# Patient Record
Sex: FEMALE | Race: WHITE | Employment: UNEMPLOYED | URBAN - NONMETROPOLITAN AREA
[De-identification: names, ages, dates, MRNs, and addresses within clinical notes are randomized per-mention and may not be internally consistent; named-entity substitution may affect disease eponyms.]

---

## 2022-07-05 ENCOUNTER — APPOINTMENT (OUTPATIENT)
Dept: GENERAL RADIOLOGY | Age: 62
End: 2022-07-05
Attending: EMERGENCY MEDICINE
Payer: MEDICAID

## 2022-07-05 ENCOUNTER — HOSPITAL ENCOUNTER (EMERGENCY)
Age: 62
Discharge: HOME | End: 2022-07-05
Attending: EMERGENCY MEDICINE
Payer: MEDICAID

## 2022-07-05 VITALS
BODY MASS INDEX: 26.76 KG/M2 | SYSTOLIC BLOOD PRESSURE: 123 MMHG | DIASTOLIC BLOOD PRESSURE: 73 MMHG | WEIGHT: 166.5 LBS | RESPIRATION RATE: 18 BRPM | OXYGEN SATURATION: 96 % | HEIGHT: 66 IN | HEART RATE: 98 BPM | TEMPERATURE: 98.3 F

## 2022-07-05 DIAGNOSIS — M67.922 TENDINOPATHY OF ELBOW, LEFT: Primary | ICD-10-CM

## 2022-07-05 PROCEDURE — 73090 X-RAY EXAM OF FOREARM: CPT

## 2022-07-05 PROCEDURE — 74011250637 HC RX REV CODE- 250/637: Performed by: EMERGENCY MEDICINE

## 2022-07-05 PROCEDURE — 99283 EMERGENCY DEPT VISIT LOW MDM: CPT

## 2022-07-05 RX ORDER — INDOMETHACIN 25 MG/1
25 CAPSULE ORAL 3 TIMES DAILY
Qty: 30 CAPSULE | Refills: 0 | Status: SHIPPED | OUTPATIENT
Start: 2022-07-05 | End: 2022-07-15

## 2022-07-05 RX ORDER — ACETAMINOPHEN 325 MG/1
650 TABLET ORAL
Qty: 20 TABLET | Refills: 0 | Status: SHIPPED | OUTPATIENT
Start: 2022-07-05

## 2022-07-05 RX ORDER — LIDOCAINE 50 MG/G
PATCH TOPICAL
Qty: 1 EACH | Refills: 0 | Status: SHIPPED | OUTPATIENT
Start: 2022-07-05

## 2022-07-05 RX ORDER — HYDROCODONE BITARTRATE AND ACETAMINOPHEN 5; 325 MG/1; MG/1
1 TABLET ORAL
Status: COMPLETED | OUTPATIENT
Start: 2022-07-05 | End: 2022-07-05

## 2022-07-05 RX ORDER — OXYCODONE HYDROCHLORIDE 5 MG/1
5 TABLET ORAL
Qty: 8 TABLET | Refills: 0 | Status: SHIPPED | OUTPATIENT
Start: 2022-07-05 | End: 2022-07-08

## 2022-07-05 RX ORDER — OXYCODONE HYDROCHLORIDE 5 MG/1
5 TABLET ORAL
Qty: 8 TABLET | Refills: 0 | Status: SHIPPED | OUTPATIENT
Start: 2022-07-05 | End: 2022-07-05 | Stop reason: SDUPTHER

## 2022-07-05 RX ADMIN — HYDROCODONE BITARTRATE AND ACETAMINOPHEN 1 TABLET: 5; 325 TABLET ORAL at 18:01

## 2022-07-05 NOTE — ED PROVIDER NOTES
Patient is a 68-year-old female without significant past medical history who presents to the emergency department complaining of 1 month of persistent left arm pain from her elbow down to her wrist.  She states that 1 month ago she was moving furniture and she felt and heard a pop and then she has had persistent pain and swelling radiating from her elbow to her mid forearm since that time. Pain with flexion extension of the elbow pain with pronation supination of the forearm. No numbness no tingling. No fevers no chills no chest pain or shortness of breath. History reviewed. No pertinent past medical history. History reviewed. No pertinent surgical history. History reviewed. No pertinent family history. Social History     Socioeconomic History    Marital status: LEGALLY      Spouse name: Not on file    Number of children: Not on file    Years of education: Not on file    Highest education level: Not on file   Occupational History    Not on file   Tobacco Use    Smoking status: Not on file    Smokeless tobacco: Not on file   Substance and Sexual Activity    Alcohol use: Not on file    Drug use: Not on file    Sexual activity: Not on file   Other Topics Concern    Not on file   Social History Narrative    Not on file     Social Determinants of Health     Financial Resource Strain:     Difficulty of Paying Living Expenses: Not on file   Food Insecurity:     Worried About Running Out of Food in the Last Year: Not on file    Esdras of Food in the Last Year: Not on file   Transportation Needs:     Lack of Transportation (Medical): Not on file    Lack of Transportation (Non-Medical):  Not on file   Physical Activity:     Days of Exercise per Week: Not on file    Minutes of Exercise per Session: Not on file   Stress:     Feeling of Stress : Not on file   Social Connections:     Frequency of Communication with Friends and Family: Not on file    Frequency of Social Gatherings with Friends and Family: Not on file    Attends Mandaen Services: Not on file    Active Member of Clubs or Organizations: Not on file    Attends Club or Organization Meetings: Not on file    Marital Status: Not on file   Intimate Partner Violence:     Fear of Current or Ex-Partner: Not on file    Emotionally Abused: Not on file    Physically Abused: Not on file    Sexually Abused: Not on file   Housing Stability:     Unable to Pay for Housing in the Last Year: Not on file    Number of [place] in the Last Year: Not on file    Unstable Housing in the Last Year: Not on file         ALLERGIES: Pcn [penicillins] and Aspirin    Review of Systems   Constitutional: Negative for appetite change and fever. HENT: Negative. Eyes: Negative. Respiratory: Negative. Negative for shortness of breath. Gastrointestinal: Negative for abdominal pain and nausea. Genitourinary: Negative. Musculoskeletal: Positive for arthralgias, joint swelling and myalgias. Skin: Negative for color change, rash and wound. Neurological: Negative. Negative for weakness and numbness. Psychiatric/Behavioral: The patient is not nervous/anxious. Vitals:    07/05/22 1744 07/05/22 1755   BP:  123/73   Pulse: 98    Resp: 18    Temp: 98.3 °F (36.8 °C)    SpO2: 96%    Weight: 75.5 kg (166 lb 8 oz)    Height: 5' 6\" (1.676 m)             Physical Exam  Vitals and nursing note reviewed. Constitutional:       Appearance: Normal appearance. HENT:      Head: Normocephalic and atraumatic. Right Ear: External ear normal.      Left Ear: External ear normal.      Nose: Nose normal.      Mouth/Throat:      Mouth: Mucous membranes are moist.   Eyes:      Pupils: Pupils are equal, round, and reactive to light. Cardiovascular:      Rate and Rhythm: Normal rate and regular rhythm. Pulses: Normal pulses. Pulmonary:      Effort: Pulmonary effort is normal.      Breath sounds: Normal breath sounds. Abdominal:      General: Abdomen is flat. Palpations: Abdomen is soft. Musculoskeletal:      Right upper arm: Normal.      Left upper arm: No tenderness. Right elbow: Normal.      Left elbow: Decreased range of motion. Tenderness present. Right forearm: Normal.      Left forearm: Tenderness and bony tenderness present. Right wrist: Normal.      Left wrist: No swelling, deformity or effusion. Normal range of motion. Normal pulse. Cervical back: Normal range of motion and neck supple. Comments: Sensation intact to light touch. Tenderness over the lateral medial malleoli on the left. Tenderness to the proximal to mid forearm. Pain with flexion extension and pronation supination. Normal range of motion normal strength at the shoulder and wrist joints. Sensation intact throughout   Skin:     Capillary Refill: Capillary refill takes less than 2 seconds. Neurological:      General: No focal deficit present. Mental Status: She is alert. MDM  Number of Diagnoses or Management Options  Diagnosis management comments: Patient is a 27-year-old female presented emergency department for evaluation of left arm pain from her elbow down to her wrist that has been ongoing for the past month. She is tender over the lateral and medial malleoli as well as proximal to distal forearm. Normal flexion extension at the wrist.  Pain with flexion extension of the elbow pronation supination.     No bony anomalies found on x-ray (ordered x-rays of the elbow wrist and forearm and 2 views of the forearm were obtained) we will treat for tendinopathy follow-up with orthopedics had sling       Amount and/or Complexity of Data Reviewed  Tests in the radiology section of CPT®: reviewed           Procedures

## 2022-07-05 NOTE — Clinical Note
[address]  [facility] EMERGENCY DEPT  [place] [phone] [id number]  [phone]    Work/School Note    Date: 7/5/2022    To Whom It May concern:    Jose Carlos Palma was seen and treated today in the emergency room by the following provider(s):  Attending Provider: Mallory Damico MD.      Jose Carlos Palma is excused from work/school on 7/5/2022 through 7/7/2022. She is medically clear to return to work/school on 7/8/2022.          Sincerely,          Wandy Aggarwal MD